# Patient Record
Sex: MALE | Race: WHITE
[De-identification: names, ages, dates, MRNs, and addresses within clinical notes are randomized per-mention and may not be internally consistent; named-entity substitution may affect disease eponyms.]

---

## 2017-10-09 ENCOUNTER — HOSPITAL ENCOUNTER (OUTPATIENT)
Dept: HOSPITAL 47 - RADECHMAIN | Age: 47
Discharge: HOME | End: 2017-10-09
Attending: INTERNAL MEDICINE
Payer: COMMERCIAL

## 2017-10-09 DIAGNOSIS — I47.1: ICD-10-CM

## 2017-10-09 DIAGNOSIS — I49.9: Primary | ICD-10-CM

## 2017-10-09 PROCEDURE — 93270 REMOTE 30 DAY ECG REV/REPORT: CPT

## 2017-10-09 PROCEDURE — 93271 ECG/MONITORING AND ANALYSIS: CPT

## 2017-10-26 NOTE — EM
EVENT MONITOR



REFERRING PHYSICIAN:

Dr. Quarles.



INDICATION:

Cardiac arrhythmia.



The patient was monitored for 14 days.  The baseline rhythm seems to be sinus

mechanism.  The patient had multiple episodes of sinus tachycardia and sinus

bradycardia.  There was also multiple episodes of PACs and PVCs.  He had 1 episode of

paroxysmal atrial tachycardia with 7 beats.  No evidence of any sustained cardiac

arrhythmia noted.  No evidence of any advanced AV block.  No evidence of any sinus node

dysfunction.



CONCLUSION:

1. This is a 14-day event monitor.

2. Sinus rhythm as a baseline mechanism.

3. The patient had 1 episode of atrial tachycardia of 7 beats noted and it was

    asymptomatic.

4. Beside that, there is no evidence of any cardiac arrhythmia.

5. No evidence of any sinus pause or sinus arrest.

6. There is no evidence of any advanced AV block.





MMODL / IJN: [id number] / Job#: [id number]

## 2020-03-16 ENCOUNTER — HOSPITAL ENCOUNTER (OUTPATIENT)
Age: 50
Discharge: HOME | End: 2020-03-16
Payer: COMMERCIAL

## 2020-03-16 DIAGNOSIS — K81.9: Primary | ICD-10-CM

## 2020-03-16 PROCEDURE — 76705 ECHO EXAM OF ABDOMEN: CPT

## 2020-07-07 ENCOUNTER — HOSPITAL ENCOUNTER (OUTPATIENT)
Dept: HOSPITAL 47 - ORWHC2ENDO | Age: 50
End: 2020-07-07
Attending: SURGERY
Payer: COMMERCIAL

## 2020-07-07 VITALS — TEMPERATURE: 97.2 F | RESPIRATION RATE: 16 BRPM

## 2020-07-07 VITALS — HEART RATE: 66 BPM | SYSTOLIC BLOOD PRESSURE: 117 MMHG | DIASTOLIC BLOOD PRESSURE: 81 MMHG

## 2020-07-07 VITALS — BODY MASS INDEX: 29 KG/M2

## 2020-07-07 DIAGNOSIS — Z98.890: ICD-10-CM

## 2020-07-07 DIAGNOSIS — Z90.89: ICD-10-CM

## 2020-07-07 DIAGNOSIS — Z79.899: ICD-10-CM

## 2020-07-07 DIAGNOSIS — E78.5: ICD-10-CM

## 2020-07-07 DIAGNOSIS — I10: ICD-10-CM

## 2020-07-07 DIAGNOSIS — K29.50: Primary | ICD-10-CM

## 2020-07-07 DIAGNOSIS — K44.9: ICD-10-CM

## 2020-07-07 DIAGNOSIS — M10.9: ICD-10-CM

## 2020-07-07 DIAGNOSIS — K21.9: ICD-10-CM

## 2020-07-07 DIAGNOSIS — K22.8: ICD-10-CM

## 2020-07-07 PROCEDURE — 43239 EGD BIOPSY SINGLE/MULTIPLE: CPT

## 2020-07-07 PROCEDURE — 88305 TISSUE EXAM BY PATHOLOGIST: CPT

## 2020-07-07 NOTE — P.GSHP
History of Present Illness


H&P Date: 07/07/20


Chief Complaint: Frequent vomiting, GERD





50-year-old male had 4 episodes of nausea vomiting and diarrhea over a two-month

span.  He has been symptom free for the last few months.  She also with history 

of hiatal hernia and chronic reflux.  Takes omeprazole every other day.  Had a 

previous EGD 13 years ago.  No dysphagia.  No melena.





Past Medical History


Past Medical History: GERD/Reflux, Hyperlipidemia, Hypertension


Additional Past Medical History / Comment(s): GOUT


History of Any Multi-Drug Resistant Organisms: None Reported


Past Surgical History: Adenoidectomy, Hernia Repair, Orthopedic Surgery, 

Tonsillectomy


Additional Past Surgical History / Comment(s): LT SHOULDER SX.  EGD.  UVULA 

REMOVED


Past Anesthesia/Blood Transfusion Reactions: No Reported Reaction


Smoking Status: Never smoker





- Past Family History


  ** Mother


History Unknown: Yes


Additional Family Medical History / Comment(s): PT ADOPTED-BIOLOGICAL HHX 

UNKNOWN





Medications and Allergies


                                Home Medications











 Medication  Instructions  Recorded  Confirmed  Type


 


Diltiazem HCl [Cartia Xt] 120 mg PO DAILY 07/06/20 07/06/20 History


 


Febuxostat [Uloric] 40 mg PO DAILY 07/06/20 07/06/20 History


 


Gemfibrozil [Lopid] 600 mg PO DAILY 07/06/20 07/06/20 History


 


Hydrochlorothiazide 12.5 mg PO DAILY 07/06/20 07/06/20 History


 


Milk Thistle 250 mg PO DAILY 07/06/20 07/06/20 History


 


Omega-3 Fatty Acids/Fish Oil 1 cap PO DAILY 07/06/20 07/06/20 History





[Omega-3 Fish Oil 1,200 mg Sfgl]    


 


Omeprazole [PriLOSEC] 10 mg PO Q2D 07/06/20 07/06/20 History








                                    Allergies











Allergy/AdvReac Type Severity Reaction Status Date / Time


 


No Known Allergies Allergy   Verified 07/07/20 08:02














Surgical - Exam


                                   Vital Signs











Temp Pulse Resp BP Pulse Ox


 


 97.2 F L  68   16   126/75   100 


 


 07/07/20 08:04  07/07/20 08:04  07/07/20 08:04  07/07/20 08:04  07/07/20 08:04

















Physical exam:


General: Well-developed, well-nourished


HEENT: Normocephalic, sclerae nonicteric


Abdomen: Nontender, nondistended


Extremities: No edema


Neuro: Alert and oriented





Assessment and Plan


(1) GERD (gastroesophageal reflux disease)


Narrative/Plan: 


Will proceed with upper endoscopy.


Current Visit: Yes   Status: Acute   Code(s): K21.9 - GASTRO-ESOPHAGEAL REFLUX 

DISEASE WITHOUT ESOPHAGITIS   SNOMED Code(s): 741342670

## 2020-07-07 NOTE — P.PCN
Date of Procedure: 07/07/20


Procedure(s) Performed: 


Preoperative Dx: GERD, intermittent nausea vomiting


Postoperative Dx: Gastritis, small sliding hiatal hernia


Procedure: EGD with Bx


Anesthesia: Sedation


Endoscopist: Dr. Rodríguez


Specimens: Antrum


Endoscopic Procedure:   The patient was on the endoscopy table in the left 

decubitus position.  The Olympus gastroscope was inserted into the oropharynx 

and passed under direct visualization to the region of the third portion of the 

duodenum.  From that point the scope was slowly withdrawn inspecting all 

surfaces carefully.  There were no neoplastic inflammatory or polypoid lesions 

throughout the duodenum.  The pylorus was widely patent.  The stomach was 

carefully inspected.  There was mild gastritis present.  A biopsy of the antrum 

took place to rule out H. pylori.  Retroflexion revealed a small sliding hiatal 

hernia.  The esophagus was then carefully examined.  There were no neoplastic 

inflammatory or polypoid lesions throughout the visualized esophagus.  The 

patient was then taken to the recovery room in stable condition per anesthesia 

guidelines.


Recommendations: Await biopsy results.  Continue every other day antiacid 

therapy.  Repeat EGD at time of next colonoscopy

## 2021-07-06 ENCOUNTER — HOSPITAL ENCOUNTER (OUTPATIENT)
Dept: HOSPITAL 47 - ORWHC2ENDO | Age: 51
Discharge: HOME | End: 2021-07-06
Attending: SURGERY
Payer: COMMERCIAL

## 2021-07-06 VITALS — RESPIRATION RATE: 16 BRPM | HEART RATE: 72 BPM | SYSTOLIC BLOOD PRESSURE: 104 MMHG | DIASTOLIC BLOOD PRESSURE: 79 MMHG

## 2021-07-06 VITALS — BODY MASS INDEX: 29 KG/M2

## 2021-07-06 VITALS — TEMPERATURE: 98.1 F

## 2021-07-06 DIAGNOSIS — G47.33: ICD-10-CM

## 2021-07-06 DIAGNOSIS — K29.50: ICD-10-CM

## 2021-07-06 DIAGNOSIS — M10.9: ICD-10-CM

## 2021-07-06 DIAGNOSIS — K21.9: Primary | ICD-10-CM

## 2021-07-06 DIAGNOSIS — E78.5: ICD-10-CM

## 2021-07-06 DIAGNOSIS — I10: ICD-10-CM

## 2021-07-06 PROCEDURE — 43239 EGD BIOPSY SINGLE/MULTIPLE: CPT

## 2021-07-06 PROCEDURE — 45378 DIAGNOSTIC COLONOSCOPY: CPT

## 2021-07-06 PROCEDURE — 88305 TISSUE EXAM BY PATHOLOGIST: CPT

## 2021-07-06 NOTE — P.PCN
Date of Procedure: 07/06/21


Procedure(s) Performed: 


PREOPERATIVE DIAGNOSIS: GERD, previous esophageal nodule, screening


POSTOPERATIVE DIAGNOSIS: Minimal gastritis, normal colon


PROCEDURE: 1.  EGD with biopsy 2.  Colonoscopy 


ANESTHESIA: MAC


SURGEON: Chava Rordíguez M.D.


SPECIMENS: Antrum


ENDOSCOPIC PROCEDURE: The patient was on the endoscopy table in the left 

decubitus position.  The Olympus gastroscope was inserted into the oropharynx 

and passed under direct visualization to the region of the third portion of the 

duodenum.  From that point the scope was slowly withdrawn inspecting all 

surfaces carefully.  There were no neoplastic inflammatory or polypoid lesions 

throughout the duodenum.  The pylorus was widely patent.  The stomach was 

carefully inspected.  There was minimal gastritis present.  A biopsy of the 

antrum took place to rule out H. pylori.  Retroflexion revealed a normal hiatus.

 The esophagus was then carefully examined.  There were no neoplastic 

inflammatory or polypoid lesions throughout the visualized esophagus.


     The patient was kept on the endoscopy table in the left decubitus position.

 The Olympus colonoscope was inserted into the anus and passed under direct 

visualization to the base of the cecum.  The appendiceal orifice was visualized.

 From that point the scope was slowly withdrawn inspecting all surfaces 

carefully.  There were no neoplastic inflammatory or polypoid lesions throughout

the cecum, ascending, transverse, descending, sigmoid and rectum.  There was 

mild left-sided diverticulosis noted.  Digital rectal examination was normal.  

The patient was taken to the recovery room in stable condition per anesthesia 

guidelines.


RECOMMENDATIONS: Resume diet.  Follow colonoscopy 10 years.

## 2021-07-06 NOTE — P.GSHP
History of Present Illness


H&P Date: 07/06/21


Chief Complaint: GERD, screening





Patient here today for colonoscopy.  Patient has not had 1 previously.  No bowel

complaints.  No family history of colon cancer.  He had an upper endoscopy last 

July.  There was a small subcutaneous nodule near the GE junction which we have 

advised follow-up EGD in 1 year.  We will add EGD to the the scheduled 

colonoscopy.  Patient also describes intermittent episodes of vomiting.  Happens

once every 3-4 months.





Past Medical History


Past Medical History: GERD/Reflux, Hyperlipidemia, Hypertension, Sleep 

Apnea/CPAP/BIPAP


Additional Past Medical History / Comment(s): Gout.  Uses cpap


History of Any Multi-Drug Resistant Organisms: None Reported


Past Surgical History: Adenoidectomy, Hernia Repair, Orthopedic Surgery, Tonsi

llectomy


Additional Past Surgical History / Comment(s): LT SHOULDER SX.  EGD.  UVULA 

REMOVED


Past Anesthesia/Blood Transfusion Reactions: No Reported Reaction


Smoking Status: Never smoker





- Past Family History


  ** Mother


History Unknown: Yes


Additional Family Medical History / Comment(s): PT ADOPTED-BIOLOGICAL HHX 

UNKNOWN





Medications and Allergies


                                Home Medications











 Medication  Instructions  Recorded  Confirmed  Type


 


Diltiazem HCl [Cartia Xt] 120 mg PO DAILY 07/06/20 07/06/21 History


 


Febuxostat [Uloric] 40 mg PO DAILY 07/06/20 07/06/21 History


 


Gemfibrozil [Lopid] 600 mg PO DAILY 07/06/20 07/06/21 History


 


Hydrochlorothiazide 12.5 mg PO DAILY 07/06/20 07/06/21 History





[hydroCHLOROthiazide]    


 


Milk Thistle 250 mg PO DAILY 07/06/20 07/06/21 History


 


Omega-3 Fatty Acids/Fish Oil 1 cap PO DAILY 07/06/20 07/06/21 History





[Omega-3 Fish Oil 1,200 mg Sfgl]    


 


Omeprazole [PriLOSEC] 10 mg PO Q2D 07/06/20 07/06/21 History








                                    Allergies











Allergy/AdvReac Type Severity Reaction Status Date / Time


 


No Known Allergies Allergy   Verified 07/01/21 14:29














Surgical - Exam


                                   Vital Signs











Temp Pulse Resp BP Pulse Ox


 


 98.1 F   73   16   128/84   100 


 


 07/06/21 09:56  07/06/21 09:56  07/06/21 09:56  07/06/21 09:56  07/06/21 09:56

















Physical exam:


General: Well-developed, well-nourished


HEENT: Normocephalic, sclerae nonicteric


Abdomen: Nontender, nondistended


Extremities: No edema


Neuro: Alert and oriented





Assessment and Plan


(1) GERD (gastroesophageal reflux disease)


Narrative/Plan: 


Will proceed with upper and lower endoscopy


Current Visit: No   Status: Acute   Code(s): K21.9 - GASTRO-ESOPHAGEAL REFLUX 

DISEASE WITHOUT ESOPHAGITIS   SNOMED Code(s): 384253286